# Patient Record
Sex: FEMALE | Race: WHITE | NOT HISPANIC OR LATINO | URBAN - NONMETROPOLITAN AREA
[De-identification: names, ages, dates, MRNs, and addresses within clinical notes are randomized per-mention and may not be internally consistent; named-entity substitution may affect disease eponyms.]

---

## 2018-11-09 PROBLEM — H26.492: Noted: 2018-11-09

## 2018-11-09 PROBLEM — Z96.1: Noted: 2018-10-23

## 2019-05-14 ENCOUNTER — IMPORTED ENCOUNTER (OUTPATIENT)
Dept: URBAN - NONMETROPOLITAN AREA CLINIC 1 | Facility: CLINIC | Age: 74
End: 2019-05-14

## 2019-05-14 PROCEDURE — 99213 OFFICE O/P EST LOW 20 MIN: CPT

## 2019-05-14 NOTE — PATIENT DISCUSSION
PCIOL OU S/P LenSx CE w Symf IOL OD 5/11/17S/P LenSx CE w Symf Toric IOL OS 5/25/17-Stable PCIOL s/p YAG Caps OD.-Monitor. Early PCO OS -Explained symptoms of advancing PCO. -Continue to monitor for now. Pt will notify us if any new symptoms develop. RTC 1 year PCO Check

## 2020-05-26 ENCOUNTER — IMPORTED ENCOUNTER (OUTPATIENT)
Dept: URBAN - NONMETROPOLITAN AREA CLINIC 1 | Facility: CLINIC | Age: 75
End: 2020-05-26

## 2020-05-26 PROCEDURE — 92014 COMPRE OPH EXAM EST PT 1/>: CPT

## 2020-05-26 NOTE — PATIENT DISCUSSION
PCIOL OU S/P LenSx CE w Symf IOL OD 5/11/17S/P LenSx CE w Symf Toric IOL OS 5/25/17-Stable PCIOL s/p YAG Caps OD. PCO OS-Explained PCO and RBAs of YAG Capsulotomy to pt. -Pt elects to proceed.  REFER FOR YAG CONSULT OS

## 2020-06-08 ENCOUNTER — IMPORTED ENCOUNTER (OUTPATIENT)
Dept: URBAN - NONMETROPOLITAN AREA CLINIC 1 | Facility: CLINIC | Age: 75
End: 2020-06-08

## 2020-06-08 PROCEDURE — 66821 AFTER CATARACT LASER SURGERY: CPT

## 2020-06-08 PROCEDURE — 92014 COMPRE OPH EXAM EST PT 1/>: CPT

## 2020-06-08 NOTE — PATIENT DISCUSSION
PCO-Explained PCO and RBAs of YAG Capsulotomy to pt. -Pt elects to proceed. YAG Caps OS today 1 week PO w/ Dr. Cuca Campos.

## 2020-06-15 ENCOUNTER — IMPORTED ENCOUNTER (OUTPATIENT)
Dept: URBAN - NONMETROPOLITAN AREA CLINIC 1 | Facility: CLINIC | Age: 75
End: 2020-06-15

## 2020-06-15 PROCEDURE — 99024 POSTOP FOLLOW-UP VISIT: CPT

## 2021-10-12 ENCOUNTER — IMPORTED ENCOUNTER (OUTPATIENT)
Dept: URBAN - NONMETROPOLITAN AREA CLINIC 1 | Facility: CLINIC | Age: 76
End: 2021-10-12

## 2021-10-12 PROBLEM — Z96.1: Noted: 2021-10-12

## 2021-10-12 PROBLEM — H26.492: Noted: 2021-10-12

## 2021-10-12 PROCEDURE — 92014 COMPRE OPH EXAM EST PT 1/>: CPT

## 2021-11-29 NOTE — PATIENT DISCUSSION
Explained condition and risk of angle closure. Recommended proceeding with LPI OD then OS to reduce risk of angle closure. Discussed LPI R/B/A's. Discussed symptoms of angles closure attack and advised to avoid medications with glaucoma warnings until after proceeding with LPI. All questions answered and handout given. Pt wishes to proceed. Pt instructed to  PO drop and hold until after laser. E-Rx sent for LotemaxSM/Prednisolone 1% qid for 4 days, then bid for 4 days, then stop in PO eye, then hold drop for second eye.

## 2022-04-09 ASSESSMENT — TONOMETRY
OS_IOP_MMHG: 16
OD_IOP_MMHG: 16
OD_IOP_MMHG: 16
OD_IOP_MMHG: 12
OD_IOP_MMHG: 16
OD_IOP_MMHG: 16
OS_IOP_MMHG: 16

## 2022-04-09 ASSESSMENT — VISUAL ACUITY
OD_CC: 20/40
OU_CC: 20/20
OS_GLARE: 20/40
OD_CC: 20/30
OS_CC: 20/20
OS_CC: 20/30
OD_CC: 20/25
OS_CC: 20/30
OS_CC: 20/25
OD_CC: 20/30-2
OS_PH: 20/30
OS_GLARE: 20/30

## 2023-01-05 ENCOUNTER — ESTABLISHED PATIENT (OUTPATIENT)
Dept: RURAL CLINIC 1 | Facility: CLINIC | Age: 78
End: 2023-01-05

## 2023-01-05 DIAGNOSIS — Z96.1: ICD-10-CM

## 2023-01-05 PROCEDURE — 92014 COMPRE OPH EXAM EST PT 1/>: CPT

## 2023-01-05 ASSESSMENT — VISUAL ACUITY
OU_SC: 20/40
OS_BAT: 20/40
OD_PH: 20/30
OU_SC: 20/25-1
OD_BAT: 20/50
OU_CC: 20/30
OD_SC: 20/40-2
OS_SC: 20/25

## 2023-01-05 ASSESSMENT — TONOMETRY
OD_IOP_MMHG: 19
OS_IOP_MMHG: 19

## 2024-01-09 ENCOUNTER — ESTABLISHED PATIENT (OUTPATIENT)
Dept: RURAL CLINIC 1 | Facility: CLINIC | Age: 79
End: 2024-01-09

## 2024-01-09 DIAGNOSIS — H26.492: ICD-10-CM

## 2024-01-09 DIAGNOSIS — Z96.1: ICD-10-CM

## 2024-01-09 PROCEDURE — 92014 COMPRE OPH EXAM EST PT 1/>: CPT

## 2024-01-09 ASSESSMENT — VISUAL ACUITY
OU_OTHER: NCC W/ OTC +1.50
OU_SC: 20/25
OD_BAT: 20/60
OD_CC: 20/30
OU_CC: 20/30
OS_BAT: 20/50
OD_OTHER: NCC W/ OTC +1.50
OS_SC: 20/25
OS_OTHER: NCC W/ OTC +1.50
OS_CC: 20/30
OD_SC: 20/50+1

## 2024-01-09 ASSESSMENT — TONOMETRY
OD_IOP_MMHG: 18
OS_IOP_MMHG: 18

## 2024-06-06 ENCOUNTER — EMERGENCY VISIT (OUTPATIENT)
Dept: RURAL CLINIC 1 | Facility: CLINIC | Age: 79
End: 2024-06-06

## 2024-06-06 DIAGNOSIS — H10.45: ICD-10-CM

## 2024-06-06 PROCEDURE — 92012 INTRM OPH EXAM EST PATIENT: CPT

## 2024-06-06 ASSESSMENT — VISUAL ACUITY
OD_SC: 20/40
OS_SC: 20/25-1

## 2024-06-06 ASSESSMENT — TONOMETRY
OD_IOP_MMHG: 16
OS_IOP_MMHG: 16

## 2025-01-14 ENCOUNTER — COMPREHENSIVE EXAM (OUTPATIENT)
Age: 80
End: 2025-01-14

## 2025-01-14 DIAGNOSIS — H26.492: ICD-10-CM

## 2025-01-14 DIAGNOSIS — Z96.1: ICD-10-CM

## 2025-01-14 DIAGNOSIS — H10.45: ICD-10-CM

## 2025-01-14 PROCEDURE — 92014 COMPRE OPH EXAM EST PT 1/>: CPT
